# Patient Record
Sex: MALE | Race: WHITE | NOT HISPANIC OR LATINO | Employment: FULL TIME | URBAN - NONMETROPOLITAN AREA
[De-identification: names, ages, dates, MRNs, and addresses within clinical notes are randomized per-mention and may not be internally consistent; named-entity substitution may affect disease eponyms.]

---

## 2020-02-11 ENCOUNTER — OFFICE VISIT (OUTPATIENT)
Dept: PULMONOLOGY | Facility: CLINIC | Age: 30
End: 2020-02-11

## 2020-02-11 VITALS
DIASTOLIC BLOOD PRESSURE: 72 MMHG | RESPIRATION RATE: 18 BRPM | OXYGEN SATURATION: 98 % | SYSTOLIC BLOOD PRESSURE: 128 MMHG | HEART RATE: 97 BPM | HEIGHT: 71 IN | WEIGHT: 207 LBS | BODY MASS INDEX: 28.98 KG/M2

## 2020-02-11 DIAGNOSIS — R06.83 SNORING: ICD-10-CM

## 2020-02-11 DIAGNOSIS — G47.33 OBSTRUCTIVE SLEEP APNEA: Primary | ICD-10-CM

## 2020-02-11 DIAGNOSIS — G47.19 EXCESSIVE DAYTIME SLEEPINESS: ICD-10-CM

## 2020-02-11 PROCEDURE — 99204 OFFICE O/P NEW MOD 45 MIN: CPT | Performed by: INTERNAL MEDICINE

## 2020-02-11 NOTE — PROGRESS NOTES
"CONSULT NOTE    Requested by:   Rachelle Mancia MD       Chief Complaint   Patient presents with   • Consult   • Sleeping Problem       Subjective:  Dao Chaudhary is a 29 y.o. male.     History of Present Illness   Patient came in today for evaluation of possible sleep apnea. Patient says that for the past few years he snores loudly.     he feels that he doesn't get restful night sleep and his quality has diminished considerably. he does feel sleepy watching TV and reading a book.      he is not complaining of occasional headaches. Patient mentions having rare nights when he has nightmares.    There is no known family history of sleep apnea.     he drinks 3-4 cups/cans of caffeinated drinks per day.      The following portions of the patient's history were reviewed and updated as appropriate: allergies, current medications, past family history, past medical history, past social history and past surgical history.    Review of Systems   Constitutional: Positive for fatigue. Negative for chills and fever.   HENT: Negative for sinus pressure, sneezing and sore throat.    Respiratory: Negative for cough, chest tightness, shortness of breath and wheezing.    Cardiovascular: Negative for palpitations and leg swelling.   Psychiatric/Behavioral: Positive for sleep disturbance.   All other systems reviewed and are negative.      No past medical history on file.    Social History     Tobacco Use   • Smoking status: Never Smoker   • Smokeless tobacco: Never Used   Substance Use Topics   • Alcohol use: Never     Frequency: Never         Objective:  Visit Vitals  /72   Pulse 97   Resp 18   Ht 180.3 cm (71\")   Wt 93.9 kg (207 lb)   SpO2 98%   BMI 28.87 kg/m²       Physical Exam   Constitutional: He is oriented to person, place, and time. He appears well-developed and well-nourished.   HENT:   Head: Atraumatic.   Crowded Oropharynx.    Eyes: Pupils are equal, round, and reactive to light. EOM are normal.   Neck: No JVD " present. No tracheal deviation present. No thyromegaly present.   Increased adipose tissue.    Cardiovascular: Normal rate and regular rhythm.   Pulmonary/Chest: Effort normal and breath sounds normal. No respiratory distress. He has no wheezes.   Musculoskeletal: Normal range of motion. He exhibits no edema.   Gait was normal.   Neurological: He is alert and oriented to person, place, and time.   Skin: Skin is warm and dry.   Psychiatric: He has a normal mood and affect. His behavior is normal.   Vitals reviewed.      Assessment/Plan:  Dao was seen today for consult and sleeping problem.    Diagnoses and all orders for this visit:    Obstructive sleep apnea  -     Home Sleep Study; Future    Snoring  -     Home Sleep Study; Future    Excessive daytime sleepiness  -     Home Sleep Study; Future        Return in about 4 months (around 6/11/2020) for Amelia/Kayla, Give Sleep quest, ....Also 9-10 mths w/ Dr. Khan.    DISCUSSION(if any):  Sleep questionnaire was provided to the patient.    The pathophysiology of sleep apnea was discussed, with the patient.     We will encourage him to schedule the sleep study soon.     The patient was made aware of the limitation of the home sleep study, whereby it may underestimate the true AHI and also carries a low sensitivity.  I have informed him that even if the home sleep study is negative, we may suggest an in lab sleep study to completely and definitively rule out/in sleep apnea.  The patient has understood.  This was communicated to the patient, in case home study is to be requested.    The patient is agreeable to try CPAP/BiPAP, if needed.     Patient was educated on good sleep hygiene measures and voiced understanding of the same.     Patient was given reading material regarding sleep apnea.    Patient was counseled regarding weight loss.       Dictated utilizing Dragon dictation.    This document was electronically signed by Ebony Khan MD on 02/11/20 at 1:56  PM

## 2020-02-17 ENCOUNTER — HOSPITAL ENCOUNTER (OUTPATIENT)
Dept: SLEEP MEDICINE | Facility: HOSPITAL | Age: 30
Discharge: HOME | End: 2020-02-17
Admitting: INTERNAL MEDICINE

## 2020-02-17 DIAGNOSIS — R06.83 SNORING: ICD-10-CM

## 2020-02-17 DIAGNOSIS — G47.33 OBSTRUCTIVE SLEEP APNEA: ICD-10-CM

## 2020-02-17 DIAGNOSIS — G47.19 EXCESSIVE DAYTIME SLEEPINESS: ICD-10-CM

## 2020-02-17 PROCEDURE — 95806 SLEEP STUDY UNATT&RESP EFFT: CPT

## 2020-02-17 PROCEDURE — 95806 SLEEP STUDY UNATT&RESP EFFT: CPT | Performed by: INTERNAL MEDICINE

## 2020-03-17 DIAGNOSIS — G47.33 OSA (OBSTRUCTIVE SLEEP APNEA): Primary | ICD-10-CM

## 2021-03-15 ENCOUNTER — IMMUNIZATION (OUTPATIENT)
Dept: VACCINE CLINIC | Facility: HOSPITAL | Age: 31
End: 2021-03-15

## 2021-03-15 PROCEDURE — 0001A: CPT | Performed by: INTERNAL MEDICINE

## 2021-03-15 PROCEDURE — 91300 HC SARSCOV02 VAC 30MCG/0.3ML IM: CPT | Performed by: INTERNAL MEDICINE

## 2021-04-05 ENCOUNTER — IMMUNIZATION (OUTPATIENT)
Dept: VACCINE CLINIC | Facility: HOSPITAL | Age: 31
End: 2021-04-05

## 2021-04-05 PROCEDURE — 91300 HC SARSCOV02 VAC 30MCG/0.3ML IM: CPT | Performed by: INTERNAL MEDICINE

## 2021-04-05 PROCEDURE — 0002A: CPT | Performed by: INTERNAL MEDICINE
